# Patient Record
Sex: FEMALE | Race: WHITE | NOT HISPANIC OR LATINO | Employment: FULL TIME | URBAN - METROPOLITAN AREA
[De-identification: names, ages, dates, MRNs, and addresses within clinical notes are randomized per-mention and may not be internally consistent; named-entity substitution may affect disease eponyms.]

---

## 2017-07-21 ENCOUNTER — OFFICE VISIT (OUTPATIENT)
Dept: URGENT CARE | Facility: CLINIC | Age: 37
End: 2017-07-21
Payer: COMMERCIAL

## 2017-07-21 ENCOUNTER — LAB VISIT (OUTPATIENT)
Dept: LAB | Facility: HOSPITAL | Age: 37
End: 2017-07-21
Attending: INTERNAL MEDICINE
Payer: COMMERCIAL

## 2017-07-21 VITALS
BODY MASS INDEX: 25.49 KG/M2 | WEIGHT: 168.19 LBS | OXYGEN SATURATION: 99 % | RESPIRATION RATE: 20 BRPM | TEMPERATURE: 98 F | HEIGHT: 68 IN | DIASTOLIC BLOOD PRESSURE: 74 MMHG | HEART RATE: 82 BPM | SYSTOLIC BLOOD PRESSURE: 110 MMHG

## 2017-07-21 DIAGNOSIS — R50.9 FEVER, UNSPECIFIED FEVER CAUSE: Primary | ICD-10-CM

## 2017-07-21 DIAGNOSIS — R50.9 FEVER, UNSPECIFIED FEVER CAUSE: ICD-10-CM

## 2017-07-21 DIAGNOSIS — E10.8 TYPE 1 DIABETES MELLITUS WITH COMPLICATION: ICD-10-CM

## 2017-07-21 LAB
BASOPHILS # BLD AUTO: 0.03 K/UL
BASOPHILS NFR BLD: 0.4 %
BILIRUB SERPL-MCNC: NORMAL MG/DL
BLOOD URINE, POC: NORMAL
COLOR, POC UA: YELLOW
CTP QC/QA: YES
DIFFERENTIAL METHOD: ABNORMAL
EOSINOPHIL # BLD AUTO: 0.2 K/UL
EOSINOPHIL NFR BLD: 2.4 %
ERYTHROCYTE [DISTWIDTH] IN BLOOD BY AUTOMATED COUNT: 12.5 %
GLUCOSE SERPL-MCNC: 290 MG/DL (ref 70–110)
GLUCOSE UR QL STRIP: 250
HCT VFR BLD AUTO: 38.3 %
HGB BLD-MCNC: 12.8 G/DL
KETONES UR QL STRIP: NORMAL
LEUKOCYTE ESTERASE URINE, POC: NORMAL
LYMPHOCYTES # BLD AUTO: 2.7 K/UL
LYMPHOCYTES NFR BLD: 37.8 %
MCH RBC QN AUTO: 31.9 PG
MCHC RBC AUTO-ENTMCNC: 33.4 G/DL
MCV RBC AUTO: 96 FL
MONOCYTES # BLD AUTO: 0.6 K/UL
MONOCYTES NFR BLD: 8.3 %
NEUTROPHILS # BLD AUTO: 3.6 K/UL
NEUTROPHILS NFR BLD: 51.1 %
NITRITE, POC UA: NORMAL
PH, POC UA: 5
PLATELET # BLD AUTO: 441 K/UL
PMV BLD AUTO: 10 FL
PROTEIN, POC: NORMAL
RBC # BLD AUTO: 4.01 M/UL
S PYO RRNA THROAT QL PROBE: NEGATIVE
SPECIFIC GRAVITY, POC UA: 1
UROBILINOGEN, POC UA: NORMAL
WBC # BLD AUTO: 7.01 K/UL

## 2017-07-21 PROCEDURE — 87081 CULTURE SCREEN ONLY: CPT

## 2017-07-21 PROCEDURE — 87880 STREP A ASSAY W/OPTIC: CPT | Mod: QW,S$GLB,, | Performed by: PHYSICIAN ASSISTANT

## 2017-07-21 PROCEDURE — 99999 PR PBB SHADOW E&M-NEW PATIENT-LVL IV: CPT | Mod: PBBFAC,,, | Performed by: PHYSICIAN ASSISTANT

## 2017-07-21 PROCEDURE — 85025 COMPLETE CBC W/AUTO DIFF WBC: CPT

## 2017-07-21 PROCEDURE — 81002 URINALYSIS NONAUTO W/O SCOPE: CPT | Mod: S$GLB,,, | Performed by: PHYSICIAN ASSISTANT

## 2017-07-21 PROCEDURE — 4010F ACE/ARB THERAPY RXD/TAKEN: CPT | Mod: S$GLB,,, | Performed by: PHYSICIAN ASSISTANT

## 2017-07-21 PROCEDURE — 82948 REAGENT STRIP/BLOOD GLUCOSE: CPT | Mod: S$GLB,,, | Performed by: PHYSICIAN ASSISTANT

## 2017-07-21 PROCEDURE — 99213 OFFICE O/P EST LOW 20 MIN: CPT | Mod: 25,S$GLB,, | Performed by: PHYSICIAN ASSISTANT

## 2017-07-21 PROCEDURE — 36415 COLL VENOUS BLD VENIPUNCTURE: CPT | Mod: PO

## 2017-07-21 PROCEDURE — 3008F BODY MASS INDEX DOCD: CPT | Mod: S$GLB,,, | Performed by: PHYSICIAN ASSISTANT

## 2017-07-21 RX ORDER — INSULIN GLARGINE 100 [IU]/ML
INJECTION, SOLUTION SUBCUTANEOUS
COMMUNITY

## 2017-07-21 RX ORDER — SERTRALINE HYDROCHLORIDE 25 MG/1
15 TABLET, FILM COATED ORAL DAILY
COMMUNITY

## 2017-07-21 RX ORDER — ALPRAZOLAM 1 MG/1
1 TABLET ORAL 2 TIMES DAILY PRN
COMMUNITY

## 2017-07-21 RX ORDER — MIRTAZAPINE 30 MG/1
30 TABLET, FILM COATED ORAL NIGHTLY
COMMUNITY

## 2017-07-21 RX ORDER — LISINOPRIL 10 MG/1
10 TABLET ORAL DAILY
COMMUNITY

## 2017-07-21 NOTE — PATIENT INSTRUCTIONS
Febrile Illness, Uncertain Cause (Adult)  You have a fever, but the cause is not certain. A fever is a natural reaction of the body to an illness such as infection due to a virus or bacteria. In most cases, the temperature itself is not harmful. It actually helps the body fight infections. A fever does not need to be treated unless you feel very uncomfortable.  Sometimes a fever can be an early sign of a more serious infection, so make sure to follow up if your condition worsens.  Home care  Unless given other instructions by your healthcare provider, follow these guidelines when caring for yourself at home.  General care  · If your symptoms are severe, rest at home for the first 2 to 3 days. When you resume activity, don't let yourself get too tired.  · Do not smoke. Also avoid being exposed to secondhand smoke.  · Your appetite may be poor, so a light diet is fine. Avoid dehydration by drinking 6 to 8 glasses of fluids per day (such as water, soft drinks, sports drinks, juices, tea, or soup). Extra fluids will help loosen secretions in the nose and lungs.  Medicines  · You can take acetaminophen or ibuprofen for pain, unless you were given a different fever-reducing/pain medicine to use. (Note: If you have chronic liver or kidney disease or have ever had a stomach ulcer or gastrointestinal bleeding, talk with your healthcare provider before using these medicines. Also talk to your provider if you are taking medicine to prevent blood clots.) Aspirin should never be given to anyone younger than 18 years of age who is ill with a viral infection or fever. It may cause severe liver or brain damage.  · If you were given antibiotics, take them until they are used up, or your healthcare provider tells you to stop. It is important to finish the antibiotics even though you feel better. This is to make sure the infection has cleared. Be aware that antibiotics are not usually given for a fever with an unknown  cause.  · Over-the-counter medicines will not shorten the duration of the illness. However, they may be helpful for the following symptoms: cough, sore throat, or nasal and sinus congestion. Ask your pharmacist for product suggestions. (Note: Do not use decongestants if you have high blood pressure.)  Follow-up care  Follow up with your healthcare provider, or as advised.  · If a culture was done, you will be notified if your treatment needs to be changed. You can call as directed for the results.  · If X-rays, a CT, or an ultrasound were done, a specialist will review them. You will be notified of any findings that may affect your care.  Call 911  Contact emergency services right away if any of these occur:  · Trouble breathing or swallowing, or wheezing  · Chest pain  · Confusion  · Extreme drowsiness or trouble awakening  · Fainting or loss of consciousness  · Rapid heart rate  · Low blood pressure  · Vomiting blood, or large amounts of blood in stool  · Seizure  When to seek medical advice  Call your healthcare provider right away if any of these occur:  · Cough with lots of colored sputum (mucus) or blood in your sputum  · Severe headache  · Face, neck, throat, or ear pain  · Feeling drowsy  · Abdominal pain  · Repeated vomiting or diarrhea  · Joint pain or a new rash  · Burning when urinating  · Fever of 100.4°F (38°C) or higher, that does not get better after taking fever-reducing medicine  · Feeling weak or dizzy  Date Last Reviewed: 7/30/2015  © 3267-2775 [employer]. [address] [place] [address], [place] [address]. All rights reserved. This information is not intended as a substitute for professional medical care. Always follow your healthcare professional's instructions.

## 2017-07-21 NOTE — PROGRESS NOTES
"Subjective:      Patient ID: Elda Cummings is a 37 y.o. female.    Chief Complaint: Fever    Ms. Cummings is a 38yo female that presents to Urgent Care for fever.  Patient states she woke up this morning sweating and felt feverish.  Patient denies taking temperature at home but states she felt warm.     Patient reports she has a history of interstitial cystitis and it tends to worsen under stress and when she is on her cycle (she is currently on day 5 of her cycle).     Patient also states she has a wound on her L foot from cutting it on an escalator in [place] a few weeks ago.  She thinks her last tetanus was in 2011.     Patient states she also has a history of anxiety and she is very overwhelmed and stressed.  Patient states she took half a Xanax for anxiety today, it did seem to help. "I don't go to doctors often and when I do I get very emotional because I'm worried something will be wrong".       Fever    This is a new problem. The current episode started today (this morning). Associated symptoms include urinary pain (h/o interstitial cystitis ). Pertinent negatives include no abdominal pain, congestion, coughing, diarrhea, sore throat, vomiting or wheezing. Treatments tried: Ibuprofen 8:30, Aspirin 12.     Review of Systems   Constitutional: Positive for diaphoresis and fever. Negative for chills.   HENT: Negative for congestion, rhinorrhea and sore throat.    Respiratory: Negative for cough, shortness of breath and wheezing.    Gastrointestinal: Negative for abdominal pain, constipation, diarrhea, rectal pain and vomiting.   Genitourinary: Positive for dysuria (h/o interstitial cystitis ). Negative for hematuria.        "foamy"       Objective:   /74 (BP Location: Left arm, Patient Position: Sitting, BP Method: Manual)   Pulse 82   Temp 97.8 °F (36.6 °C) (Tympanic)   Resp 20   Ht 5' 8" (1.727 m)   Wt 76.3 kg (168 lb 3.4 oz)   SpO2 99%   BMI 25.58 kg/m²   Physical Exam   Constitutional: Vital signs are " normal. She appears well-developed and well-nourished.   HENT:   Head: Normocephalic and atraumatic.   Right Ear: Tympanic membrane and ear canal normal. No tenderness. Tympanic membrane is not erythematous.   Left Ear: Tympanic membrane and ear canal normal. No tenderness. Tympanic membrane is not erythematous.   Nose: No mucosal edema or rhinorrhea. Right sinus exhibits maxillary sinus tenderness (mild) and frontal sinus tenderness (mild). Left sinus exhibits maxillary sinus tenderness (mild) and frontal sinus tenderness (mild).   Mouth/Throat: Uvula is midline and oropharynx is clear and moist.   Cardiovascular: Normal rate, regular rhythm and normal heart sounds.    No murmur heard.  Pulmonary/Chest: Effort normal and breath sounds normal. No respiratory distress. She has no decreased breath sounds. She has no wheezes. She has no rhonchi. She has no rales.   Abdominal: Soft. Bowel sounds are normal. She exhibits no distension. There is no tenderness.   Skin: Skin is warm and dry. Abrasion noted. No rash noted. She is not diaphoretic.   3- 1cm scabbed lacerations to the bottom of the foot; no erythema, warm or drainage    Psychiatric: Her speech is normal and behavior is normal. Thought content normal. Her mood appears anxious.   Patient very tearful on examination     Assessment:      1. Fever, unspecified fever cause    2. Type 1 diabetes mellitus with complication       Plan:   Fever, unspecified fever cause  -     POCT urine dipstick without microscope  -     POCT Influenza A/B  -     POCT rapid strep A  -     Strep A culture, throat  -     CBC auto differential; Future; Expected date: 07/21/2017    Type 1 diabetes mellitus with complication  -     POCT glucose    Advise tetanus vaccination due to recent foot injury and patient defers due to upcoming vacation, she states she doesn't want to risk any side effects or reaction.     Reviewed negative in office step, flu and urine.  Dicussed elevated blood sugar  in office as well.     No fever in office may be due to medication or perhaps fever broke this morning.  Advise patient to treat fever at home with medication as needed.    If symptoms worsen or persist, follow up with PCP.  Advise patient that if symptoms continue throughout the weekend she should follow up with PCP before leaving the country next Wed.    Recommend patient take Xanax as prescribed to help with anxiety symptoms she is currently having.      Gave patient handout on fever.  Printed and reviewed AVS.

## 2017-07-23 LAB — BACTERIA THROAT CULT: NORMAL
